# Patient Record
Sex: MALE | Employment: STUDENT | URBAN - METROPOLITAN AREA
[De-identification: names, ages, dates, MRNs, and addresses within clinical notes are randomized per-mention and may not be internally consistent; named-entity substitution may affect disease eponyms.]

---
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
## 2022-12-19 ENCOUNTER — TELEPHONE (OUTPATIENT)
Dept: FAMILY MEDICINE CLINIC | Age: 22
End: 2022-12-19

## 2022-12-19 ENCOUNTER — OFFICE VISIT (OUTPATIENT)
Dept: FAMILY MEDICINE CLINIC | Age: 22
End: 2022-12-19
Payer: MEDICARE

## 2022-12-19 VITALS
TEMPERATURE: 98 F | BODY MASS INDEX: 26.23 KG/M2 | HEIGHT: 70 IN | WEIGHT: 183.2 LBS | HEART RATE: 57 BPM | OXYGEN SATURATION: 97 % | DIASTOLIC BLOOD PRESSURE: 64 MMHG | SYSTOLIC BLOOD PRESSURE: 110 MMHG

## 2022-12-19 DIAGNOSIS — H65.02 ACUTE SEROUS OTITIS MEDIA OF LEFT EAR, RECURRENCE NOT SPECIFIED: Primary | ICD-10-CM

## 2022-12-19 DIAGNOSIS — Z82.49 FAMILY HISTORY OF ISCHEMIC HEART DISEASE BEFORE AGE 50: ICD-10-CM

## 2022-12-19 DIAGNOSIS — Z72.53 HIGH RISK BISEXUAL BEHAVIOR: ICD-10-CM

## 2022-12-19 DIAGNOSIS — Z13.220 SCREENING CHOLESTEROL LEVEL: ICD-10-CM

## 2022-12-19 DIAGNOSIS — F12.90 MARIJUANA USE, CONTINUOUS: ICD-10-CM

## 2022-12-19 PROBLEM — Z86.19 HISTORY OF CHLAMYDIA INFECTION: Status: ACTIVE | Noted: 2022-12-19

## 2022-12-19 PROBLEM — F41.1 GAD (GENERALIZED ANXIETY DISORDER): Status: ACTIVE | Noted: 2022-04-14

## 2022-12-19 PROBLEM — L70.9 ACNE: Status: ACTIVE | Noted: 2022-12-19

## 2022-12-19 PROBLEM — Z86.19 HISTORY OF SYPHILIS: Status: ACTIVE | Noted: 2022-12-19

## 2022-12-19 PROBLEM — F33.2 SEVERE EPISODE OF RECURRENT MAJOR DEPRESSIVE DISORDER, WITHOUT PSYCHOTIC FEATURES (HCC): Status: ACTIVE | Noted: 2022-04-13

## 2022-12-19 PROCEDURE — 1036F TOBACCO NON-USER: CPT | Performed by: FAMILY MEDICINE

## 2022-12-19 PROCEDURE — 99204 OFFICE O/P NEW MOD 45 MIN: CPT | Performed by: FAMILY MEDICINE

## 2022-12-19 PROCEDURE — G8427 DOCREV CUR MEDS BY ELIG CLIN: HCPCS | Performed by: FAMILY MEDICINE

## 2022-12-19 PROCEDURE — G8482 FLU IMMUNIZE ORDER/ADMIN: HCPCS | Performed by: FAMILY MEDICINE

## 2022-12-19 PROCEDURE — G8419 CALC BMI OUT NRM PARAM NOF/U: HCPCS | Performed by: FAMILY MEDICINE

## 2022-12-19 RX ORDER — IPRATROPIUM BROMIDE 42 UG/1
2 SPRAY, METERED NASAL 3 TIMES DAILY
Qty: 15 ML | Refills: 0 | Status: SHIPPED | OUTPATIENT
Start: 2022-12-19

## 2022-12-19 RX ORDER — ERYTHROMYCIN AND BENZOYL PEROXIDE 30; 50 MG/G; MG/G
GEL TOPICAL
COMMUNITY
Start: 2022-12-10

## 2022-12-19 RX ORDER — AMOXICILLIN 875 MG/1
875 TABLET, COATED ORAL 2 TIMES DAILY
Qty: 20 TABLET | Refills: 0 | Status: SHIPPED | OUTPATIENT
Start: 2022-12-19 | End: 2022-12-29

## 2022-12-19 SDOH — ECONOMIC STABILITY: FOOD INSECURITY: WITHIN THE PAST 12 MONTHS, THE FOOD YOU BOUGHT JUST DIDN'T LAST AND YOU DIDN'T HAVE MONEY TO GET MORE.: NEVER TRUE

## 2022-12-19 SDOH — ECONOMIC STABILITY: FOOD INSECURITY: WITHIN THE PAST 12 MONTHS, YOU WORRIED THAT YOUR FOOD WOULD RUN OUT BEFORE YOU GOT MONEY TO BUY MORE.: NEVER TRUE

## 2022-12-19 ASSESSMENT — PATIENT HEALTH QUESTIONNAIRE - PHQ9
SUM OF ALL RESPONSES TO PHQ QUESTIONS 1-9: 0
1. LITTLE INTEREST OR PLEASURE IN DOING THINGS: 0
2. FEELING DOWN, DEPRESSED OR HOPELESS: 0
SUM OF ALL RESPONSES TO PHQ QUESTIONS 1-9: 0
SUM OF ALL RESPONSES TO PHQ9 QUESTIONS 1 & 2: 0

## 2022-12-19 ASSESSMENT — ENCOUNTER SYMPTOMS
SHORTNESS OF BREATH: 0
SORE THROAT: 0
COUGH: 0
NAUSEA: 0
DIARRHEA: 0
RHINORRHEA: 0
TROUBLE SWALLOWING: 0
SINUS PAIN: 0
ABDOMINAL PAIN: 0
VOMITING: 0
SINUS PRESSURE: 0
CHEST TIGHTNESS: 0
WHEEZING: 0

## 2022-12-19 ASSESSMENT — SOCIAL DETERMINANTS OF HEALTH (SDOH): HOW HARD IS IT FOR YOU TO PAY FOR THE VERY BASICS LIKE FOOD, HOUSING, MEDICAL CARE, AND HEATING?: NOT HARD AT ALL

## 2022-12-19 NOTE — ASSESSMENT & PLAN NOTE
Reviewed with patient the association of mood disorders with chronic marijuana use and also association with potential cognitive dysfunction. Patient was advised to avoid mood altering substances with known past history of underlying depressed mood and anxiety. Patient states he will make best efforts to cut back and eventually quit marijuana use. We will continue to encourage complete abstinence from marijuana use over time.

## 2022-12-19 NOTE — PROGRESS NOTES
Lencho Mcgowan (: 2000) is a 25 y.o. male, Established patient, who presents today for:    Chief Complaint   Patient presents with    New Patient     Patient is present to [facility] care Patient states that he has left ear pain/ringing Patient states that sx started 2 days ago          ASSESSMENT/PLAN    1. Acute serous otitis media of left ear, recurrence not specified  Comments: Will treat with course of antibiotic. Would refer to ENT for persistent symptoms despite management. Orders:  -     amoxicillin (AMOXIL) 875 MG tablet; Take 1 tablet by mouth 2 times daily for 10 days, Disp-20 tablet, R-0Normal  -     ipratropium (ATROVENT) 0.06 % nasal spray; 2 sprays by Each Nostril route 3 times daily, Disp-15 mL, R-0Normal  2. High risk bisexual behavior  Assessment & Plan:  I stressed with patient the need for condom use with every sexual encounter. I reviewed with patient that condoms can break and the only 100% method of preventing unintended pregnancy and STIs is abstinence. Patient was educated that monogamy should be practiced if he/she chose to be sexually active. Patient was also educated that hormonal contraception does not protect against STI's and is not 100% effective, and that unintended pregnancies can occur even with regular use as directed. Orders:  -     C.trachomatis N.gonorrhoeae DNA, Urine; Future  -     Hepatitis B Surface Antigen; Future  -     Hepatitis C Antibody; Future  -     HIV-1,2 Combo Ag/Ab By CONRAD, Reflexive Panel; Future  -     RPR with FTA Relex; Future  -     Trichomonas vaginalis RNA, Qualitative, TMA, Pap Vial; Future  3. Marijuana use, continuous  Assessment & Plan:  Reviewed with patient the association of mood disorders with chronic marijuana use and also association with potential cognitive dysfunction. Patient was advised to avoid mood altering substances with known past history of underlying depressed mood and anxiety.   Patient states he will make best efforts to cut back and eventually quit marijuana use. We will continue to encourage complete abstinence from marijuana use over time. 4. Family history of ischemic heart disease before age 48  -     Lipid Panel; Future  5. Screening cholesterol level  -     Lipid Panel; Future    Return for Annual Physical in 4-6 Months. SUBJECTIVE/OBJECTIVE:    HPI    Patient presents for acute visit regarding left ear pain with tinnitus. Patient reports a 2-day history of left ear discomfort described as sharp pain with ear fullness and ringing to the ear. He denies any improvement with use of hydrogen peroxide to the ear. He denies any drainage/bleeding from the ear, hearing loss, or dizziness. He denies F/C/S, headaches, anosmia, sinus pain, sore throat, cough, dyspnea, wheezing, chest pain, abdominal pain, nausea, vomiting, or diarrhea. He denies any close sick contacts or recent travel. He reports similar symptoms to the right ear 6 months ago for which he was treated for an ear infection with benefit/resolution. Patient reports known history of depression with anxiety starting in middle school. He reports being seen by a counselor/therapist in the past year, only went to 1-2 visits. He denies any medications in the past for management, but reports being admitted to hospital in [place] due to worsening depression. He states he was discharged after a 3 day stay. He denies any significant depressed mood currently with change of school. He is now going to [employer] and is an environmental science major. He reports a close [place] of friends that provide support. He reports intermittent anxiety, but states this is manageable and he denies any frequent bouts of overwhelming panic. There are no reported problems with sleep. Patient reports only infrequent alcohol use without binge drinking behaviors. He admits to frequent marijuana use several days a week.     Patient admits to sexual activity with multiple female and male partners over the past year, \"at least 30 or 40. \"  He reports occasionally using condoms, but admits to unprotected vaginal and anal intercourse (both performing and receiving). Patient denies any dysuria, hematuria, penile discharge, penile lesions, testicular pain or swelling or scrotal pain or swelling. Patient reports past history of chlamydia and syphilis status post treatment. He requests STI testing. Current Outpatient Medications on File Prior to Visit   Medication Sig Dispense Refill    benzoyl peroxide-erythromycin (BENZAMYCIN) 5-3 % gel APPLY TO AFFECTED AREA TWICE A DAY       No current facility-administered medications on file prior to visit. No Known Allergies     Review of Systems   Constitutional:  Negative for appetite change, chills, diaphoresis, fatigue and fever. HENT:  Positive for ear pain and tinnitus. Negative for congestion, hearing loss, postnasal drip, rhinorrhea, sinus pressure, sinus pain, sore throat and trouble swallowing. Eyes:  Negative for visual disturbance. Respiratory:  Negative for cough, chest tightness, shortness of breath and wheezing. Cardiovascular:  Negative for chest pain, palpitations and leg swelling. Gastrointestinal:  Negative for abdominal pain, diarrhea, nausea and vomiting. Genitourinary:  Negative for dysuria, flank pain, frequency, hematuria, penile discharge, penile pain, penile swelling, scrotal swelling, testicular pain and urgency. Musculoskeletal:  Negative for myalgias. Skin:  Negative for rash. Neurological:  Negative for dizziness, syncope, light-headedness and headaches. Hematological:  Negative for adenopathy. Psychiatric/Behavioral:  Negative for dysphoric mood, self-injury, sleep disturbance and suicidal ideas. The patient is not nervous/anxious.       Vitals:  /64 (Site: Right Upper Arm, Position: Sitting, Cuff Size: Medium Adult)   Pulse 57   Temp 98 °F (36.7 °C) (Temporal)   Ht 5' 10\" (1.778 m)   Wt 183 lb 3.2 oz (83.1 kg)   SpO2 97%   BMI 26.29 kg/m²     Physical Exam  Vitals reviewed. Constitutional:       General: He is not in acute distress. Appearance: Normal appearance. He is not diaphoretic. HENT:      Head: Normocephalic and atraumatic. Salivary Glands: Right salivary gland is not diffusely enlarged or tender. Left salivary gland is not diffusely enlarged or tender. Right Ear: Tympanic membrane, ear canal and external ear normal. No middle ear effusion. There is no impacted cerumen. No foreign body. Tympanic membrane is not erythematous or bulging. Left Ear: Ear canal and external ear normal. A middle ear effusion is present. There is no impacted cerumen. No foreign body. Tympanic membrane is erythematous. Tympanic membrane is not bulging. Nose: Nose normal. No mucosal edema, congestion or rhinorrhea. Right Turbinates: Not enlarged, swollen or pale. Left Turbinates: Not enlarged, swollen or pale. Right Sinus: No maxillary sinus tenderness or frontal sinus tenderness. Left Sinus: No maxillary sinus tenderness or frontal sinus tenderness. Mouth/Throat:      Lips: Pink. No lesions. Mouth: Mucous membranes are moist. No oral lesions. Tongue: No lesions. Palate: No mass and lesions. Pharynx: No pharyngeal swelling, oropharyngeal exudate or posterior oropharyngeal erythema. Eyes:      General:         Right eye: No discharge. Left eye: No discharge. Conjunctiva/sclera: Conjunctivae normal.      Pupils: Pupils are equal, round, and reactive to light. Cardiovascular:      Rate and Rhythm: Normal rate and regular rhythm. Heart sounds: No murmur heard. Pulmonary:      Effort: Pulmonary effort is normal. No respiratory distress. Breath sounds: Normal breath sounds. No wheezing, rhonchi or rales. Abdominal:      General: Bowel sounds are normal.      Palpations: Abdomen is soft. Tenderness:  There is no abdominal

## 2022-12-19 NOTE — TELEPHONE ENCOUNTER
[facility]    Address: [address] [facility] [address] [facility] [address]    Phone: [phone]    Pt requesting medical records sent to new PCP, Dr. Amanuel Benson

## 2022-12-19 NOTE — ASSESSMENT & PLAN NOTE
I stressed with patient the need for condom use with every sexual encounter. I reviewed with patient that condoms can break and the only 100% method of preventing unintended pregnancy and STIs is abstinence. Patient was educated that monogamy should be practiced if he/she chose to be sexually active. Patient was also educated that hormonal contraception does not protect against STI's and is not 100% effective, and that unintended pregnancies can occur even with regular use as directed.

## 2023-05-31 NOTE — TELEPHONE ENCOUNTER
PT Requesting refill on benzoyl peroxide-erythromycin (BENZAMYCIN) 5-3 % gel      LOV 12/19/22  No future appts

## 2023-06-01 RX ORDER — ERYTHROMYCIN AND BENZOYL PEROXIDE 30; 50 MG/G; MG/G
GEL TOPICAL 2 TIMES DAILY
Qty: 46.6 G | Refills: 0 | Status: SHIPPED | OUTPATIENT
Start: 2023-06-01

## 2023-07-24 NOTE — TELEPHONE ENCOUNTER
Pt is requesting medication refill. Please approve or deny this request.    Rx requested:  Requested Prescriptions     Pending Prescriptions Disp Refills    benzoyl peroxide-erythromycin (BENZAMYCIN) 5-3 % gel 46.6 g 0     Sig: Apply topically 2 times daily - to affected areas         Last Office Visit:   12/19/2022      Next Visit Date:  No future appointments.

## 2023-07-24 NOTE — TELEPHONE ENCOUNTER
Pt requesting a refill on the   benzoyl peroxide-erythromycin (BENZAMYCIN) 5-3 % gel      LOV 12/19/22  No future appts

## 2023-07-25 RX ORDER — ERYTHROMYCIN AND BENZOYL PEROXIDE 30; 50 MG/G; MG/G
GEL TOPICAL 2 TIMES DAILY
Qty: 46.6 G | Refills: 0 | Status: SHIPPED | OUTPATIENT
Start: 2023-07-25

## 2023-08-08 ENCOUNTER — OFFICE VISIT (OUTPATIENT)
Dept: FAMILY MEDICINE CLINIC | Age: 23
End: 2023-08-08

## 2023-08-08 VITALS
SYSTOLIC BLOOD PRESSURE: 118 MMHG | HEIGHT: 67 IN | BODY MASS INDEX: 30.13 KG/M2 | WEIGHT: 192 LBS | OXYGEN SATURATION: 98 % | DIASTOLIC BLOOD PRESSURE: 62 MMHG | HEART RATE: 76 BPM

## 2023-08-08 DIAGNOSIS — L70.9 ACNE, UNSPECIFIED ACNE TYPE: ICD-10-CM

## 2023-08-08 DIAGNOSIS — Z00.00 WELL ADULT EXAM: Primary | ICD-10-CM

## 2023-08-08 PROCEDURE — 99395 PREV VISIT EST AGE 18-39: CPT | Performed by: FAMILY MEDICINE

## 2023-08-08 RX ORDER — ERYTHROMYCIN AND BENZOYL PEROXIDE 30; 50 MG/G; MG/G
GEL TOPICAL 2 TIMES DAILY
Qty: 46.6 G | Refills: 5 | Status: SHIPPED | OUTPATIENT
Start: 2023-08-08

## 2023-08-08 RX ORDER — EMTRICITABINE AND TENOFOVIR DISOPROXIL FUMARATE 200; 300 MG/1; MG/1
1 TABLET, FILM COATED ORAL DAILY
COMMUNITY
Start: 2023-06-29

## 2023-08-08 ASSESSMENT — ENCOUNTER SYMPTOMS
WHEEZING: 0
COLOR CHANGE: 0
ABDOMINAL DISTENTION: 0
SHORTNESS OF BREATH: 0
DIARRHEA: 0
RHINORRHEA: 0
EYE DISCHARGE: 0
ANAL BLEEDING: 0
SORE THROAT: 0
VOMITING: 0
COUGH: 0
TROUBLE SWALLOWING: 0
CHEST TIGHTNESS: 0
EYE PAIN: 0
CONSTIPATION: 0
ABDOMINAL PAIN: 0
BLOOD IN STOOL: 0
SINUS PRESSURE: 0
NAUSEA: 0

## 2023-08-08 NOTE — PROGRESS NOTES
Floresita Barrow (: 2000) is a 25 y.o. male, Established patient, who presents today for:    Chief Complaint   Patient presents with    Medication Refill         ASSESSMENT/PLAN    1. Well adult exam  Comments: Well appearing 20yo male with exam as listed below. 2. Acne, unspecified acne type  -     benzoyl peroxide-erythromycin (BENZAMYCIN) 5-3 % gel; Apply topically 2 times daily - to affected areas, Topical, 2 TIMES DAILY Starting Tue 2023, Disp-46.6 g, R-5, Normal      Return in about 1 year (around 2024) for Annual Physical.       SUBJECTIVE/OBJECTIVE:    HPI    Patient presents for routine well visit. They deny any acute concerns or complaints. Current Outpatient Medications on File Prior to Visit   Medication Sig Dispense Refill    emtricitabine-tenofovir (TRUVADA) 200-300 MG per tablet Take 1 tablet by mouth daily       No current facility-administered medications on file prior to visit. No Known Allergies     Review of Systems   Constitutional:  Negative for appetite change, chills, diaphoresis, fatigue, fever and unexpected weight change. HENT:  Negative for congestion, ear pain, postnasal drip, rhinorrhea, sinus pressure, sore throat and trouble swallowing. Eyes:  Negative for pain, discharge and visual disturbance. Respiratory:  Negative for cough, chest tightness, shortness of breath and wheezing. Cardiovascular:  Negative for chest pain, palpitations and leg swelling. No orthopnea, No PND   Gastrointestinal:  Negative for abdominal distention, abdominal pain, anal bleeding, blood in stool, constipation, diarrhea, nausea and vomiting. No heartburn, No melena   Endocrine: Negative for cold intolerance, heat intolerance, polydipsia, polyphagia and polyuria. Genitourinary:  Negative for dysuria, flank pain, frequency, hematuria and urgency. Musculoskeletal:  Negative for gait problem and myalgias. Skin:  Negative for color change and rash.

## 2023-08-22 DIAGNOSIS — L70.9 ACNE, UNSPECIFIED ACNE TYPE: ICD-10-CM

## 2023-08-22 RX ORDER — EMTRICITABINE AND TENOFOVIR DISOPROXIL FUMARATE 200; 300 MG/1; MG/1
1 TABLET, FILM COATED ORAL DAILY
Qty: 30 TABLET | Refills: 3 | Status: CANCELLED | OUTPATIENT
Start: 2023-08-22

## 2023-08-22 RX ORDER — ERYTHROMYCIN AND BENZOYL PEROXIDE 30; 50 MG/G; MG/G
GEL TOPICAL 2 TIMES DAILY
Qty: 46.6 G | Refills: 5 | Status: SHIPPED | OUTPATIENT
Start: 2023-08-22

## 2023-08-22 NOTE — TELEPHONE ENCOUNTER
Pt is requesting refills to be sent to [facility] pharmacy in [facility] since he is starting college and will be there for the next couple months. Please advise.     LOV: 8/8/2023  No future appts    Ph: [phone]

## 2023-08-22 NOTE — TELEPHONE ENCOUNTER
Pt is requesting medication refill. Please approve or deny this request.    Rx requested:  Requested Prescriptions     Pending Prescriptions Disp Refills    benzoyl peroxide-erythromycin (BENZAMYCIN) 5-3 % gel 46.6 g 5     Sig: Apply topically 2 times daily - to affected areas    emtricitabine-tenofovir (TRUVADA) 200-300 MG per tablet 30 tablet 3     Sig: Take 1 tablet by mouth daily         Last Office Visit:   8/8/2023      Next Visit Date:  No future appointments.

## 2023-08-22 NOTE — TELEPHONE ENCOUNTER
Benzamycin gel has been sent. Patient will need to get Truvada from the prescriber who started him on the medication.

## 2024-07-22 DIAGNOSIS — L70.9 ACNE, UNSPECIFIED ACNE TYPE: ICD-10-CM

## 2024-07-22 RX ORDER — ERYTHROMYCIN AND BENZOYL PEROXIDE 30; 50 MG/G; MG/G
GEL TOPICAL 2 TIMES DAILY
Qty: 46.6 G | Refills: 0 | Status: SHIPPED | OUTPATIENT
Start: 2024-07-22

## 2024-07-22 NOTE — TELEPHONE ENCOUNTER
Pharmacy is requesting medication refill. Please approve or deny this request.    Rx requested:  Requested Prescriptions     Pending Prescriptions Disp Refills    benzoyl peroxide-erythromycin (BENZAMYCIN) 5-3 % gel [Pharmacy Med Name: erythromycin-benzoyl peroxide 3 %-5 % topical gel] 46.6 g 1     Sig: APPLY topically TWICE DAILY TO THE AFFECTED AREA(S)         Last Office Visit:   8/8/2023      Next Visit Date:  No future appointments.

## 2024-07-22 NOTE — TELEPHONE ENCOUNTER
Short term refill has been sent to pharmacy. Please call patient to schedule Well Adult Exam/Physical appointment in the next month.

## 2024-08-27 DIAGNOSIS — L70.9 ACNE, UNSPECIFIED ACNE TYPE: ICD-10-CM

## 2024-08-27 RX ORDER — ERYTHROMYCIN AND BENZOYL PEROXIDE 30; 50 MG/G; MG/G
GEL TOPICAL 2 TIMES DAILY
Qty: 46.6 G | Refills: 0 | OUTPATIENT
Start: 2024-08-27

## 2024-08-27 NOTE — TELEPHONE ENCOUNTER
Comments:     Last Office Visit (last PCP visit):   8/8/2023    Next Visit Date:  No future appointments.    **If hasn't been seen in over a year OR hasn't followed up according to last diabetes/ADHD visit, make appointment for patient before sending refill to provider.    Rx requested:  Requested Prescriptions     Pending Prescriptions Disp Refills    benzoyl peroxide-erythromycin (BENZAMYCIN) 5-3 % gel [Pharmacy Med Name: erythromycin-benzoyl peroxide 3 %-5 % topical gel] 46.6 g 0     Sig: Apply topically 2 times daily to affected areas

## 2024-08-29 DIAGNOSIS — L70.9 ACNE, UNSPECIFIED ACNE TYPE: ICD-10-CM

## 2024-08-29 RX ORDER — ERYTHROMYCIN AND BENZOYL PEROXIDE 30; 50 MG/G; MG/G
GEL TOPICAL 2 TIMES DAILY
Qty: 46.6 G | Refills: 0 | OUTPATIENT
Start: 2024-08-29

## 2024-09-03 DIAGNOSIS — L70.9 ACNE, UNSPECIFIED ACNE TYPE: ICD-10-CM

## 2024-09-03 RX ORDER — ERYTHROMYCIN AND BENZOYL PEROXIDE 30; 50 MG/G; MG/G
GEL TOPICAL 2 TIMES DAILY
Qty: 46.6 G | Refills: 0 | Status: SHIPPED | OUTPATIENT
Start: 2024-09-03 | End: 2024-09-04 | Stop reason: SDUPTHER

## 2024-09-03 NOTE — TELEPHONE ENCOUNTER
Pt called and scheduled an appt tomorrow 9/4. Bc he is in need of a refill for his cream, but it was denied bc pt had not been seen in a year.

## 2024-09-03 NOTE — TELEPHONE ENCOUNTER
Comments:     Last Office Visit (last PCP visit):   8/8/2023    Next Visit Date:  Future Appointments   Date Time Provider Department Center   9/4/2024 11:45 AM Jian Jesus MD [facility]       **If hasn't been seen in over a year OR hasn't followed up according to last diabetes/ADHD visit, make appointment for patient before sending refill to provider.    Rx requested:  Requested Prescriptions     Pending Prescriptions Disp Refills    benzoyl peroxide-erythromycin (BENZAMYCIN) 5-3 % gel 46.6 g 0     Sig: Apply topically 2 times daily to affected areas

## 2024-09-04 ENCOUNTER — TELEPHONE (OUTPATIENT)
Dept: FAMILY MEDICINE CLINIC | Age: 24
End: 2024-09-04

## 2024-09-04 ENCOUNTER — HOSPITAL ENCOUNTER (OUTPATIENT)
Dept: LAB | Age: 24
Discharge: HOME | End: 2024-09-04
Payer: MEDICAID

## 2024-09-04 ENCOUNTER — OFFICE VISIT (OUTPATIENT)
Dept: FAMILY MEDICINE CLINIC | Age: 24
End: 2024-09-04
Payer: MEDICAID

## 2024-09-04 VITALS
TEMPERATURE: 97.6 F | HEIGHT: 67 IN | OXYGEN SATURATION: 97 % | DIASTOLIC BLOOD PRESSURE: 60 MMHG | BODY MASS INDEX: 31.23 KG/M2 | HEART RATE: 70 BPM | WEIGHT: 199 LBS | SYSTOLIC BLOOD PRESSURE: 130 MMHG

## 2024-09-04 DIAGNOSIS — L70.9 ACNE, UNSPECIFIED ACNE TYPE: ICD-10-CM

## 2024-09-04 DIAGNOSIS — Z72.53 HIGH RISK BISEXUAL BEHAVIOR: ICD-10-CM

## 2024-09-04 DIAGNOSIS — R35.0 URINARY FREQUENCY: ICD-10-CM

## 2024-09-04 DIAGNOSIS — B07.9 VIRAL WARTS, UNSPECIFIED TYPE: ICD-10-CM

## 2024-09-04 DIAGNOSIS — Z00.01 ENCOUNTER FOR WELL ADULT EXAM WITH ABNORMAL FINDINGS: Primary | ICD-10-CM

## 2024-09-04 LAB
ALBUMIN SERPL-MCNC: 4.4 G/DL (ref 3.5–4.6)
ALP SERPL-CCNC: 65 U/L (ref 35–104)
ALT SERPL-CCNC: 14 U/L (ref 0–41)
ANION GAP SERPL CALCULATED.3IONS-SCNC: 14 MEQ/L (ref 9–15)
AST SERPL-CCNC: 17 U/L (ref 0–40)
BASOPHILS # BLD: 0 K/UL (ref 0–0.2)
BASOPHILS NFR BLD: 0.6 %
BILIRUB SERPL-MCNC: 0.4 MG/DL (ref 0.2–0.7)
BUN SERPL-MCNC: 17 MG/DL (ref 6–20)
CALCIUM SERPL-MCNC: 9.4 MG/DL (ref 8.5–9.9)
CHLORIDE SERPL-SCNC: 102 MEQ/L (ref 95–107)
CO2 SERPL-SCNC: 20 MEQ/L (ref 20–31)
CREAT SERPL-MCNC: 0.84 MG/DL (ref 0.7–1.2)
EOSINOPHIL # BLD: 0.2 K/UL (ref 0–0.7)
EOSINOPHIL NFR BLD: 2.8 %
ERYTHROCYTE [DISTWIDTH] IN BLOOD BY AUTOMATED COUNT: 13.2 % (ref 11.5–14.5)
GLOBULIN SER CALC-MCNC: 3 G/DL (ref 2.3–3.5)
GLUCOSE SERPL-MCNC: 89 MG/DL (ref 70–99)
HBV SURFACE AG SERPL QL IA: NORMAL
HCT VFR BLD AUTO: 44.1 % (ref 42–52)
HGB BLD-MCNC: 14.7 G/DL (ref 14–18)
LYMPHOCYTES # BLD: 2.1 K/UL (ref 1–4.8)
LYMPHOCYTES NFR BLD: 38.7 %
MCH RBC QN AUTO: 30.1 PG (ref 27–31.3)
MCHC RBC AUTO-ENTMCNC: 33.3 % (ref 33–37)
MCV RBC AUTO: 90.4 FL (ref 79–92.2)
MONOCYTES # BLD: 0.6 K/UL (ref 0.2–0.8)
MONOCYTES NFR BLD: 11.9 %
NEUTROPHILS # BLD: 2.4 K/UL (ref 1.4–6.5)
NEUTS SEG NFR BLD: 45.8 %
PLATELET # BLD AUTO: 302 K/UL (ref 130–400)
POTASSIUM SERPL-SCNC: 4.1 MEQ/L (ref 3.4–4.9)
PROT SERPL-MCNC: 7.4 G/DL (ref 6.3–8)
RBC # BLD AUTO: 4.88 M/UL (ref 4.7–6.1)
SODIUM SERPL-SCNC: 136 MEQ/L (ref 135–144)
TSH REFLEX: 1.36 UIU/ML (ref 0.44–3.86)
WBC # BLD AUTO: 5.3 K/UL (ref 4.8–10.8)

## 2024-09-04 PROCEDURE — 87086 URINE CULTURE/COLONY COUNT: CPT

## 2024-09-04 PROCEDURE — 87491 CHLMYD TRACH DNA AMP PROBE: CPT

## 2024-09-04 PROCEDURE — 99213 OFFICE O/P EST LOW 20 MIN: CPT | Performed by: FAMILY MEDICINE

## 2024-09-04 PROCEDURE — 86592 SYPHILIS TEST NON-TREP QUAL: CPT

## 2024-09-04 PROCEDURE — 87661 TRICHOMONAS VAGINALIS AMPLIF: CPT

## 2024-09-04 PROCEDURE — 87340 HEPATITIS B SURFACE AG IA: CPT

## 2024-09-04 PROCEDURE — 99395 PREV VISIT EST AGE 18-39: CPT | Performed by: FAMILY MEDICINE

## 2024-09-04 PROCEDURE — 84443 ASSAY THYROID STIM HORMONE: CPT

## 2024-09-04 PROCEDURE — 36415 COLL VENOUS BLD VENIPUNCTURE: CPT

## 2024-09-04 PROCEDURE — 87389 HIV-1 AG W/HIV-1&-2 AB AG IA: CPT

## 2024-09-04 PROCEDURE — 86780 TREPONEMA PALLIDUM: CPT

## 2024-09-04 PROCEDURE — 85025 COMPLETE CBC W/AUTO DIFF WBC: CPT

## 2024-09-04 PROCEDURE — 87591 N.GONORRHOEAE DNA AMP PROB: CPT

## 2024-09-04 PROCEDURE — 86593 SYPHILIS TEST NON-TREP QUANT: CPT

## 2024-09-04 PROCEDURE — 80053 COMPREHEN METABOLIC PANEL: CPT

## 2024-09-04 PROCEDURE — 86803 HEPATITIS C AB TEST: CPT

## 2024-09-04 RX ORDER — EMTRICITABINE AND TENOFOVIR ALAFENAMIDE 200; 25 MG/1; MG/1
1 TABLET ORAL DAILY
COMMUNITY
Start: 2024-08-07

## 2024-09-04 RX ORDER — ERYTHROMYCIN AND BENZOYL PEROXIDE 30; 50 MG/G; MG/G
GEL TOPICAL 2 TIMES DAILY
Qty: 46.6 G | Refills: 3 | Status: SHIPPED | OUTPATIENT
Start: 2024-09-04

## 2024-09-04 SDOH — ECONOMIC STABILITY: FOOD INSECURITY: WITHIN THE PAST 12 MONTHS, THE FOOD YOU BOUGHT JUST DIDN'T LAST AND YOU DIDN'T HAVE MONEY TO GET MORE.: NEVER TRUE

## 2024-09-04 SDOH — ECONOMIC STABILITY: FOOD INSECURITY: WITHIN THE PAST 12 MONTHS, YOU WORRIED THAT YOUR FOOD WOULD RUN OUT BEFORE YOU GOT MONEY TO BUY MORE.: NEVER TRUE

## 2024-09-04 SDOH — ECONOMIC STABILITY: INCOME INSECURITY: HOW HARD IS IT FOR YOU TO PAY FOR THE VERY BASICS LIKE FOOD, HOUSING, MEDICAL CARE, AND HEATING?: NOT HARD AT ALL

## 2024-09-04 ASSESSMENT — PATIENT HEALTH QUESTIONNAIRE - PHQ9
SUM OF ALL RESPONSES TO PHQ QUESTIONS 1-9: 0
4. FEELING TIRED OR HAVING LITTLE ENERGY: NOT AT ALL
3. TROUBLE FALLING OR STAYING ASLEEP: NOT AT ALL
5. POOR APPETITE OR OVEREATING: NOT AT ALL
SUM OF ALL RESPONSES TO PHQ QUESTIONS 1-9: 0
SUM OF ALL RESPONSES TO PHQ9 QUESTIONS 1 & 2: 0
7. TROUBLE CONCENTRATING ON THINGS, SUCH AS READING THE NEWSPAPER OR WATCHING TELEVISION: NOT AT ALL
8. MOVING OR SPEAKING SO SLOWLY THAT OTHER PEOPLE COULD HAVE NOTICED. OR THE OPPOSITE, BEING SO FIGETY OR RESTLESS THAT YOU HAVE BEEN MOVING AROUND A LOT MORE THAN USUAL: NOT AT ALL
6. FEELING BAD ABOUT YOURSELF - OR THAT YOU ARE A FAILURE OR HAVE LET YOURSELF OR YOUR FAMILY DOWN: NOT AT ALL
SUM OF ALL RESPONSES TO PHQ QUESTIONS 1-9: 0
1. LITTLE INTEREST OR PLEASURE IN DOING THINGS: NOT AT ALL
SUM OF ALL RESPONSES TO PHQ QUESTIONS 1-9: 0
9. THOUGHTS THAT YOU WOULD BE BETTER OFF DEAD, OR OF HURTING YOURSELF: NOT AT ALL
10. IF YOU CHECKED OFF ANY PROBLEMS, HOW DIFFICULT HAVE THESE PROBLEMS MADE IT FOR YOU TO DO YOUR WORK, TAKE CARE OF THINGS AT HOME, OR GET ALONG WITH OTHER PEOPLE: NOT DIFFICULT AT ALL
2. FEELING DOWN, DEPRESSED OR HOPELESS: NOT AT ALL

## 2024-09-04 ASSESSMENT — ENCOUNTER SYMPTOMS
SHORTNESS OF BREATH: 0
EYE DISCHARGE: 0
CONSTIPATION: 0
ANAL BLEEDING: 0
COUGH: 0
ABDOMINAL DISTENTION: 0
VOMITING: 0
COLOR CHANGE: 0
ABDOMINAL PAIN: 0
EYE PAIN: 0
BLOOD IN STOOL: 0
RHINORRHEA: 0
SINUS PRESSURE: 0
CHEST TIGHTNESS: 0
WHEEZING: 0
DIARRHEA: 0
SORE THROAT: 0
TROUBLE SWALLOWING: 0
NAUSEA: 0

## 2024-09-04 ASSESSMENT — COLUMBIA-SUICIDE SEVERITY RATING SCALE - C-SSRS
6. HAVE YOU EVER DONE ANYTHING, STARTED TO DO ANYTHING, OR PREPARED TO DO ANYTHING TO END YOUR LIFE?: NO
2. HAVE YOU ACTUALLY HAD ANY THOUGHTS OF KILLING YOURSELF?: NO
1. WITHIN THE PAST MONTH, HAVE YOU WISHED YOU WERE DEAD OR WISHED YOU COULD GO TO SLEEP AND NOT WAKE UP?: NO

## 2024-09-04 NOTE — ASSESSMENT & PLAN NOTE
I stressed with patient the need for condom use with every sexual encounter. I reviewed with patient that condoms can break and the only 100% method of preventing unintended pregnancy and STI’s is abstinence. Patient was educated that monogamy should be practiced if he/she chose to be sexually active. Patient was also educated that hormonal contraception does not protect against STI's and is not 100% effective, and that unintended pregnancies can occur even with regular use as directed.

## 2024-09-04 NOTE — PROGRESS NOTES
Uriel Art (: 2000) is a 23 y.o. male, Established patient, who presents today for:    Chief Complaint   Patient presents with    Acne     Patient presents today for acne f/u and med refill.     Labs Only     Patient would like orders for screening STD's.          Assessment & Plan     1. Encounter for well adult exam with abnormal findings  Comments:  23-year-old male with exam as listed below  2. Acne, unspecified acne type  -     benzoyl peroxide-erythromycin (BENZAMYCIN) 5-3 % gel; Apply topically 2 times daily to affected areas, Topical, 2 TIMES DAILY Starting Wed 2024, Disp-46.6 g, R-3, Normal  3. High risk bisexual behavior  Assessment & Plan:  I stressed with patient the need for condom use with every sexual encounter. I reviewed with patient that condoms can break and the only 100% method of preventing unintended pregnancy and STI’s is abstinence. Patient was educated that monogamy should be practiced if he/she chose to be sexually active. Patient was also educated that hormonal contraception does not protect against STI's and is not 100% effective, and that unintended pregnancies can occur even with regular use as directed.  Orders:  -     C.trachomatis N.gonorrhoeae DNA, Urine; Future  -     Hepatitis B Surface Antigen; Future  -     Hepatitis C Antibody; Future  -     HIV-1,2 Combo Ag/Ab By CONRAD, Reflexive Panel; Future  -     RPR with FTA Relex; Future  -     Trichomonas vaginalis RNA, Qualitative, TMA, Pap Vial; Future  4. Urinary frequency  -     Urinalysis, Micro; Future  -     Culture, Urine; Future  -     CBC with Auto Differential; Future  -     Comprehensive Metabolic Panel; Future  -     TSH with Reflex; Future  5. Viral warts, unspecified type  -     Amb External Referral To Dermatology           Return in about 1 year (around 2025) for Annual Physical.       Subjective     HPI    Patient presents for routine well visit and acne medication refill.  Care is established

## 2024-09-04 NOTE — TELEPHONE ENCOUNTER
Patient stated it was discussed to get a tetnus booster but it did not occur.  If this is something needed please advise him.    Res#  [phone]

## 2024-09-05 LAB
HIV AG/AB: NONREACTIVE
RPR SER QL: REACTIVE
RPR TITER: NORMAL

## 2024-09-06 LAB
BACTERIA UR CULT: NORMAL
C TRACH DNA UR QL NAA+PROBE: NEGATIVE
HEPATITIS C ANTIBODY: NONREACTIVE
N GONORRHOEA DNA UR QL NAA+PROBE: NEGATIVE

## 2024-09-07 LAB
SPECIMEN SOURCE: NORMAL
T VAGINALIS RRNA SPEC QL NAA+PROBE: NEGATIVE

## 2024-09-08 LAB — T PALLIDUM AB SER QL AGGL: REACTIVE

## 2024-12-20 DIAGNOSIS — L70.9 ACNE, UNSPECIFIED ACNE TYPE: ICD-10-CM

## 2024-12-20 RX ORDER — ERYTHROMYCIN AND BENZOYL PEROXIDE 30; 50 MG/G; MG/G
GEL TOPICAL 2 TIMES DAILY
Qty: 46.6 G | Refills: 1 | Status: SHIPPED | OUTPATIENT
Start: 2024-12-20

## 2024-12-20 NOTE — TELEPHONE ENCOUNTER
Comments:     Last Office Visit (last PCP visit):   9/4/2024    Next Visit Date:  Future Appointments   Date Time Provider Department Center   9/4/2025 11:30 AM Jian Jesus MD [facility]       **If hasn't been seen in over a year OR hasn't followed up according to last diabetes/ADHD visit, make appointment for patient before sending refill to provider.    Rx requested:  Requested Prescriptions     Pending Prescriptions Disp Refills    benzoyl peroxide-erythromycin (BENZAMYCIN) 5-3 % gel [Pharmacy Med Name: erythromycin-benzoyl peroxide 3 %-5 % topical gel] 46.6 g 3     Sig: Apply topically 2 times daily to affected areas

## 2025-05-07 DIAGNOSIS — L70.9 ACNE, UNSPECIFIED ACNE TYPE: ICD-10-CM

## 2025-05-07 RX ORDER — ERYTHROMYCIN AND BENZOYL PEROXIDE 30; 50 MG/G; MG/G
GEL TOPICAL 2 TIMES DAILY
Qty: 46.6 G | Refills: 1 | Status: SHIPPED | OUTPATIENT
Start: 2025-05-07

## 2025-05-07 NOTE — TELEPHONE ENCOUNTER
Pt's parent requesting refill for benzoyl peroxide-erythromycin (BENZAMYCIN) 5-3 % gel to be sent to [facility] in [place]. Please advise.    LOV: 9/4/2024  Future appts: 9/4/2025    Mel Ph: [phone]

## 2025-05-07 NOTE — TELEPHONE ENCOUNTER
Pharmacy is requesting medication refill. Please approve or deny this request.    Rx requested:  Requested Prescriptions     Pending Prescriptions Disp Refills    benzoyl peroxide-erythromycin (BENZAMYCIN) 5-3 % gel 46.6 g 1     Sig: Apply topically 2 times daily to affected areas         Last Office Visit:   Visit date not found      Next Visit Date:  Future Appointments   Date Time Provider Department Center   9/4/2025 11:30 AM Robert Levy MD [facility]

## 2025-06-11 DIAGNOSIS — L70.9 ACNE, UNSPECIFIED ACNE TYPE: ICD-10-CM

## 2025-06-11 RX ORDER — ERYTHROMYCIN AND BENZOYL PEROXIDE 30; 50 MG/G; MG/G
GEL TOPICAL 2 TIMES DAILY
Qty: 46.6 G | Refills: 1 | Status: SHIPPED | OUTPATIENT
Start: 2025-06-11

## 2025-06-11 NOTE — TELEPHONE ENCOUNTER
Comments: has appt tomorrow     Last Office Visit (last PCP visit):   9/4/2024    Next Visit Date:  Future Appointments   Date Time Provider Department Center   6/12/2025 12:30 PM Shana Johns APRN - CNP [facility]   9/4/2025 11:30 AM Robert Levy MD [facility]       **If hasn't been seen in over a year OR hasn't followed up according to last diabetes/ADHD visit, make appointment for patient before sending refill to provider.    Rx requested:  Requested Prescriptions     Pending Prescriptions Disp Refills    benzoyl peroxide-erythromycin (BENZAMYCIN) 5-3 % gel 46.6 g 1     Sig: Apply topically 2 times daily to affected areas

## 2025-06-11 NOTE — TELEPHONE ENCOUNTER
Pt requesting refill for benzoyl peroxide-erythromycin (BENZAMYCIN) 5-3 % gel to be sent to [facility] in [place]. States he is completely out. Please advise.    LOV: 9/4/2024  Future appts: 6/12/2025    Ph: [phone]

## 2025-06-12 ENCOUNTER — OFFICE VISIT (OUTPATIENT)
Dept: FAMILY MEDICINE CLINIC | Age: 25
End: 2025-06-12
Payer: MEDICAID

## 2025-06-12 ENCOUNTER — HOSPITAL ENCOUNTER (OUTPATIENT)
Dept: LAB | Age: 25
Discharge: HOME | End: 2025-06-12
Payer: MEDICAID

## 2025-06-12 VITALS
BODY MASS INDEX: 30.1 KG/M2 | WEIGHT: 192.2 LBS | HEART RATE: 52 BPM | DIASTOLIC BLOOD PRESSURE: 64 MMHG | OXYGEN SATURATION: 98 % | TEMPERATURE: 97.6 F | SYSTOLIC BLOOD PRESSURE: 106 MMHG

## 2025-06-12 DIAGNOSIS — Z72.53 HIGH RISK BISEXUAL BEHAVIOR: ICD-10-CM

## 2025-06-12 DIAGNOSIS — J02.9 ACUTE VIRAL PHARYNGITIS: Primary | ICD-10-CM

## 2025-06-12 PROCEDURE — 87389 HIV-1 AG W/HIV-1&-2 AB AG IA: CPT

## 2025-06-12 PROCEDURE — 86694 HERPES SIMPLEX NES ANTBDY: CPT

## 2025-06-12 PROCEDURE — 86695 HERPES SIMPLEX TYPE 1 TEST: CPT

## 2025-06-12 PROCEDURE — 99213 OFFICE O/P EST LOW 20 MIN: CPT | Performed by: NURSE PRACTITIONER

## 2025-06-12 PROCEDURE — 86780 TREPONEMA PALLIDUM: CPT

## 2025-06-12 PROCEDURE — 86696 HERPES SIMPLEX TYPE 2 TEST: CPT

## 2025-06-12 PROCEDURE — 36415 COLL VENOUS BLD VENIPUNCTURE: CPT

## 2025-06-12 PROCEDURE — 86593 SYPHILIS TEST NON-TREP QUANT: CPT

## 2025-06-12 RX ORDER — CEPHALEXIN 500 MG/1
CAPSULE ORAL
COMMUNITY
Start: 2025-06-03

## 2025-06-12 SDOH — ECONOMIC STABILITY: FOOD INSECURITY: WITHIN THE PAST 12 MONTHS, THE FOOD YOU BOUGHT JUST DIDN'T LAST AND YOU DIDN'T HAVE MONEY TO GET MORE.: NEVER TRUE

## 2025-06-12 SDOH — ECONOMIC STABILITY: FOOD INSECURITY: WITHIN THE PAST 12 MONTHS, YOU WORRIED THAT YOUR FOOD WOULD RUN OUT BEFORE YOU GOT MONEY TO BUY MORE.: NEVER TRUE

## 2025-06-12 ASSESSMENT — ENCOUNTER SYMPTOMS
COUGH: 1
EYE DISCHARGE: 0
COLOR CHANGE: 0
DIARRHEA: 0
SHORTNESS OF BREATH: 0
SORE THROAT: 1
CONSTIPATION: 0
WHEEZING: 0
ABDOMINAL PAIN: 0
CHEST TIGHTNESS: 0
NAUSEA: 0
VOMITING: 0
EYE REDNESS: 0

## 2025-06-12 ASSESSMENT — PATIENT HEALTH QUESTIONNAIRE - PHQ9
5. POOR APPETITE OR OVEREATING: NOT AT ALL
8. MOVING OR SPEAKING SO SLOWLY THAT OTHER PEOPLE COULD HAVE NOTICED. OR THE OPPOSITE, BEING SO FIGETY OR RESTLESS THAT YOU HAVE BEEN MOVING AROUND A LOT MORE THAN USUAL: NOT AT ALL
SUM OF ALL RESPONSES TO PHQ QUESTIONS 1-9: 0
7. TROUBLE CONCENTRATING ON THINGS, SUCH AS READING THE NEWSPAPER OR WATCHING TELEVISION: NOT AT ALL
4. FEELING TIRED OR HAVING LITTLE ENERGY: NOT AT ALL
2. FEELING DOWN, DEPRESSED OR HOPELESS: NOT AT ALL
SUM OF ALL RESPONSES TO PHQ QUESTIONS 1-9: 0
SUM OF ALL RESPONSES TO PHQ QUESTIONS 1-9: 0
6. FEELING BAD ABOUT YOURSELF - OR THAT YOU ARE A FAILURE OR HAVE LET YOURSELF OR YOUR FAMILY DOWN: NOT AT ALL
SUM OF ALL RESPONSES TO PHQ QUESTIONS 1-9: 0
1. LITTLE INTEREST OR PLEASURE IN DOING THINGS: NOT AT ALL
10. IF YOU CHECKED OFF ANY PROBLEMS, HOW DIFFICULT HAVE THESE PROBLEMS MADE IT FOR YOU TO DO YOUR WORK, TAKE CARE OF THINGS AT HOME, OR GET ALONG WITH OTHER PEOPLE: NOT DIFFICULT AT ALL
9. THOUGHTS THAT YOU WOULD BE BETTER OFF DEAD, OR OF HURTING YOURSELF: NOT AT ALL
3. TROUBLE FALLING OR STAYING ASLEEP: NOT AT ALL

## 2025-06-12 NOTE — PROGRESS NOTES
follow-ups on file.    The patient (or guardian, if applicable) and other individuals in attendance with the patient were advised that Artificial Intelligence will be utilized during this visit to record, process the conversation to generate a clinical note, and support improvement of the AI technology. The patient (or guardian, if applicable) and other individuals in attendance at the appointment consented to the use of AI, including the recording.                        An electronic signature was used to authenticate this note.     ELENA Byrne - CNP

## 2025-06-13 LAB — HIV AG/AB: NONREACTIVE

## 2025-06-14 LAB
HSV1 GG IGG SER-ACNC: >62.2 IV
HSV1+2 IGG SER IA-ACNC: >22.4 IV
HSV2 GG IGG SER-ACNC: 0.06 IV
RPR SER QL: REACTIVE
RPR SER-TITR: ABNORMAL {TITER}

## 2025-06-16 ENCOUNTER — RESULTS FOLLOW-UP (OUTPATIENT)
Dept: FAMILY MEDICINE CLINIC | Age: 25
End: 2025-06-16

## 2025-06-18 LAB
C TRACH DNA UR QL NAA+PROBE: NEGATIVE
N GONORRHOEA DNA UR QL NAA+PROBE: NEGATIVE

## 2025-06-19 DIAGNOSIS — A53.9 SYPHILIS: Primary | ICD-10-CM

## 2025-06-24 ENCOUNTER — OFFICE VISIT (OUTPATIENT)
Age: 25
End: 2025-06-24
Payer: MEDICAID

## 2025-06-24 VITALS
RESPIRATION RATE: 18 BRPM | WEIGHT: 193 LBS | HEART RATE: 55 BPM | OXYGEN SATURATION: 97 % | TEMPERATURE: 97.3 F | BODY MASS INDEX: 29.25 KG/M2 | DIASTOLIC BLOOD PRESSURE: 64 MMHG | HEIGHT: 68 IN | SYSTOLIC BLOOD PRESSURE: 116 MMHG

## 2025-06-24 DIAGNOSIS — Z86.19 HISTORY OF SYPHILIS: Primary | ICD-10-CM

## 2025-06-24 PROCEDURE — 99203 OFFICE O/P NEW LOW 30 MIN: CPT | Performed by: INTERNAL MEDICINE

## 2025-06-24 PROCEDURE — 99202 OFFICE O/P NEW SF 15 MIN: CPT | Performed by: INTERNAL MEDICINE

## 2025-06-24 ASSESSMENT — PATIENT HEALTH QUESTIONNAIRE - PHQ9
SUM OF ALL RESPONSES TO PHQ QUESTIONS 1-9: 0
2. FEELING DOWN, DEPRESSED OR HOPELESS: NOT AT ALL
1. LITTLE INTEREST OR PLEASURE IN DOING THINGS: NOT AT ALL
SUM OF ALL RESPONSES TO PHQ QUESTIONS 1-9: 0

## 2025-06-24 ASSESSMENT — ENCOUNTER SYMPTOMS
RESPIRATORY NEGATIVE: 1
GASTROINTESTINAL NEGATIVE: 1

## 2025-06-24 NOTE — PROGRESS NOTES
Infectious Disease     Patient Name: Uriel rAt  Date: 6/24/2025  YOB: 2000  Medical Record Number: [id number]              History of Present Illness:      Patient screened by primary care for sexually transmitted diseases  Had syphilis testing HIV VZV urine for gonorrhea chlamydia    Recent treatment for viral pharyngitis given cephalexin    Gonorrhea negative  Herpes simplex 1 positive herpes simplex 2 negative    HIV nonreactive    Patient on Descovy for PrEP therapy      On Augmentin for ear infection    Treated in 2023 in [place] for syphilis patient is a penicillin  RPR  6/12/2025 1-2  9/4/2024 1-8  2/8/2024 1-8  1/17/2023 1-4          Review of Systems   Constitutional: Negative.    HENT: Negative.     Respiratory: Negative.     Cardiovascular: Negative.    Gastrointestinal: Negative.    Genitourinary: Negative.    Musculoskeletal: Negative.    Skin: Negative.        Review of Systems: All 14 review of systems negative other than as stated above    Social History     Tobacco Use    Smoking status: Never    Smokeless tobacco: Never   Vaping Use    Vaping status: Former   Substance Use Topics    Alcohol use: Yes     Comment: infrequent    Drug use: Yes     Types: Marijuana (Weed)     Comment: several days a week         Past Medical History:   Diagnosis Date    Acne 12/19/2022    SARAN (generalized anxiety disorder) 4/14/2022    History of chlamydia infection 12/19/2022    History of syphilis 12/19/2022    Marijuana use, continuous 12/19/2022    Severe episode of recurrent major depressive disorder, without psychotic features ([facility]ly [facility]) 4/13/2022           No past surgical history on file.      Current Outpatient Medications on File Prior to Visit   Medication Sig Dispense Refill    cephALEXin (KEFLEX) 500 MG capsule TAKE 1 CAPSULE BY MOUTH TWICE DAILY FOR 10 DAYS WITH FOOD      benzoyl peroxide-erythromycin (BENZAMYCIN) 5-3 % gel Apply topically 2 times daily to affected areas 46.6 g 1    DESCOVY